# Patient Record
Sex: MALE | Race: WHITE | NOT HISPANIC OR LATINO | Employment: FULL TIME | URBAN - METROPOLITAN AREA
[De-identification: names, ages, dates, MRNs, and addresses within clinical notes are randomized per-mention and may not be internally consistent; named-entity substitution may affect disease eponyms.]

---

## 2022-11-29 ENCOUNTER — HOSPITAL ENCOUNTER (EMERGENCY)
Facility: MEDICAL CENTER | Age: 27
End: 2022-11-30
Attending: EMERGENCY MEDICINE
Payer: COMMERCIAL

## 2022-11-29 ENCOUNTER — APPOINTMENT (OUTPATIENT)
Dept: RADIOLOGY | Facility: MEDICAL CENTER | Age: 27
End: 2022-11-29
Attending: EMERGENCY MEDICINE
Payer: COMMERCIAL

## 2022-11-29 VITALS
SYSTOLIC BLOOD PRESSURE: 114 MMHG | RESPIRATION RATE: 17 BRPM | HEART RATE: 71 BPM | DIASTOLIC BLOOD PRESSURE: 76 MMHG | WEIGHT: 252.21 LBS | TEMPERATURE: 97.3 F | OXYGEN SATURATION: 96 %

## 2022-11-29 DIAGNOSIS — K82.8 GALLBLADDER SLUDGE: ICD-10-CM

## 2022-11-29 DIAGNOSIS — R10.11 RIGHT UPPER QUADRANT ABDOMINAL PAIN: ICD-10-CM

## 2022-11-29 DIAGNOSIS — R11.2 NAUSEA AND VOMITING, UNSPECIFIED VOMITING TYPE: ICD-10-CM

## 2022-11-29 LAB
ALBUMIN SERPL BCP-MCNC: 4.9 G/DL (ref 3.2–4.9)
ALBUMIN/GLOB SERPL: 1.8 G/DL
ALP SERPL-CCNC: 69 U/L (ref 30–99)
ALT SERPL-CCNC: 68 U/L (ref 2–50)
ANION GAP SERPL CALC-SCNC: 13 MMOL/L (ref 7–16)
AST SERPL-CCNC: 31 U/L (ref 12–45)
BASOPHILS # BLD AUTO: 0.4 % (ref 0–1.8)
BASOPHILS # BLD: 0.03 K/UL (ref 0–0.12)
BILIRUB SERPL-MCNC: 0.7 MG/DL (ref 0.1–1.5)
BUN SERPL-MCNC: 11 MG/DL (ref 8–22)
CALCIUM SERPL-MCNC: 9.7 MG/DL (ref 8.5–10.5)
CHLORIDE SERPL-SCNC: 105 MMOL/L (ref 96–112)
CO2 SERPL-SCNC: 25 MMOL/L (ref 20–33)
CREAT SERPL-MCNC: 0.94 MG/DL (ref 0.5–1.4)
EOSINOPHIL # BLD AUTO: 0.07 K/UL (ref 0–0.51)
EOSINOPHIL NFR BLD: 0.9 % (ref 0–6.9)
ERYTHROCYTE [DISTWIDTH] IN BLOOD BY AUTOMATED COUNT: 37.9 FL (ref 35.9–50)
GFR SERPLBLD CREATININE-BSD FMLA CKD-EPI: 113 ML/MIN/1.73 M 2
GLOBULIN SER CALC-MCNC: 2.7 G/DL (ref 1.9–3.5)
GLUCOSE SERPL-MCNC: 87 MG/DL (ref 65–99)
HCT VFR BLD AUTO: 49.9 % (ref 42–52)
HGB BLD-MCNC: 16.7 G/DL (ref 14–18)
IMM GRANULOCYTES # BLD AUTO: 0.03 K/UL (ref 0–0.11)
IMM GRANULOCYTES NFR BLD AUTO: 0.4 % (ref 0–0.9)
LIPASE SERPL-CCNC: 32 U/L (ref 11–82)
LYMPHOCYTES # BLD AUTO: 3.07 K/UL (ref 1–4.8)
LYMPHOCYTES NFR BLD: 38.7 % (ref 22–41)
MCH RBC QN AUTO: 28 PG (ref 27–33)
MCHC RBC AUTO-ENTMCNC: 33.5 G/DL (ref 33.7–35.3)
MCV RBC AUTO: 83.7 FL (ref 81.4–97.8)
MONOCYTES # BLD AUTO: 0.49 K/UL (ref 0–0.85)
MONOCYTES NFR BLD AUTO: 6.2 % (ref 0–13.4)
NEUTROPHILS # BLD AUTO: 4.24 K/UL (ref 1.82–7.42)
NEUTROPHILS NFR BLD: 53.4 % (ref 44–72)
NRBC # BLD AUTO: 0 K/UL
NRBC BLD-RTO: 0 /100 WBC
PLATELET # BLD AUTO: 340 K/UL (ref 164–446)
PMV BLD AUTO: 9 FL (ref 9–12.9)
POTASSIUM SERPL-SCNC: 4.3 MMOL/L (ref 3.6–5.5)
PROT SERPL-MCNC: 7.6 G/DL (ref 6–8.2)
RBC # BLD AUTO: 5.96 M/UL (ref 4.7–6.1)
SODIUM SERPL-SCNC: 143 MMOL/L (ref 135–145)
WBC # BLD AUTO: 7.9 K/UL (ref 4.8–10.8)

## 2022-11-29 PROCEDURE — 36415 COLL VENOUS BLD VENIPUNCTURE: CPT

## 2022-11-29 PROCEDURE — 76705 ECHO EXAM OF ABDOMEN: CPT

## 2022-11-29 PROCEDURE — 80053 COMPREHEN METABOLIC PANEL: CPT

## 2022-11-29 PROCEDURE — 700105 HCHG RX REV CODE 258: Performed by: EMERGENCY MEDICINE

## 2022-11-29 PROCEDURE — 700117 HCHG RX CONTRAST REV CODE 255: Performed by: EMERGENCY MEDICINE

## 2022-11-29 PROCEDURE — 99284 EMERGENCY DEPT VISIT MOD MDM: CPT

## 2022-11-29 PROCEDURE — 85025 COMPLETE CBC W/AUTO DIFF WBC: CPT

## 2022-11-29 PROCEDURE — 74177 CT ABD & PELVIS W/CONTRAST: CPT

## 2022-11-29 PROCEDURE — 83690 ASSAY OF LIPASE: CPT

## 2022-11-29 RX ORDER — SODIUM CHLORIDE 9 MG/ML
1000 INJECTION, SOLUTION INTRAVENOUS ONCE
Status: COMPLETED | OUTPATIENT
Start: 2022-11-29 | End: 2022-11-30

## 2022-11-29 RX ADMIN — SODIUM CHLORIDE 1000 ML: 9 INJECTION, SOLUTION INTRAVENOUS at 21:45

## 2022-11-29 RX ADMIN — IOHEXOL 100 ML: 350 INJECTION, SOLUTION INTRAVENOUS at 23:37

## 2022-11-30 RX ORDER — ONDANSETRON 4 MG/1
4 TABLET, ORALLY DISINTEGRATING ORAL EVERY 6 HOURS PRN
Qty: 10 TABLET | Refills: 0 | Status: ON HOLD | OUTPATIENT
Start: 2022-11-30 | End: 2022-12-12

## 2022-11-30 NOTE — ED TRIAGE NOTES
Ambulate to triage  Chief Complaint   Patient presents with    Abdominal Pain     RUQ radiates to R flank and sternum x5 days, much worse today. Hx of gallstones, still has his gallbladder    N/V     Vomited a couple times today     Putting pressure on his abd makes it feel slightly better, any movement or eating makes it worse.

## 2022-11-30 NOTE — ED NOTES
Pt discharged home. IV discontinued and gauze placed, pt in possession of belongings. Pt provided discharge education and information pertaining to medications and follow up appointments. Pt received copy of discharge instructions and verbalized understanding. /76   Pulse 71   Temp 36.3 °C (97.3 °F) (Temporal)   Resp 17   Wt 114 kg (252 lb 3.3 oz)   SpO2 96%

## 2022-11-30 NOTE — ED PROVIDER NOTES
ED Provider Note    CHIEF COMPLAINT  Chief Complaint   Patient presents with    Abdominal Pain     RUQ radiates to R flank and sternum x5 days, much worse today. Hx of gallstones, still has his gallbladder    N/V     Vomited a couple times today       HPI  Aneudy Bolanos is a 27 y.o. male who presents to the emergency department complaining of nausea and vomiting.  Past medical history largely benign.  He reports that he has previously been told about possibly having gallstones.  Over the last week he has been having increasing pain to his right upper abdomen.  Now with a deep throbbing pain which also radiates to his back and predominately on the right side.  Worse with eating.  Associated nausea but minimal vomiting.  Loose stools.  No sore throat or other known sick contacts.  No lower abdominal pain.  No difficulty urinating.  No fever chills.  No jaundice.  No foreign travel.    REVIEW OF SYSTEMS  See HPI for further details. All other systems are negative.     PAST MEDICAL HISTORY       SOCIAL HISTORY  Social History     Tobacco Use    Smoking status: Never    Smokeless tobacco: Current     Types: Chew   Vaping Use    Vaping Use: Never used   Substance and Sexual Activity    Alcohol use: Yes     Comment: rare    Drug use: Never    Sexual activity: Not on file       SURGICAL HISTORY  patient denies any surgical history    CURRENT MEDICATIONS  Home Medications       Reviewed by Tracey Ghotra R.N. (Registered Nurse) on 11/29/22 at 1749  Med List Status: Complete     Medication Last Dose Status        Patient Mikhail Taking any Medications                           ALLERGIES  No Known Allergies    PHYSICAL EXAM  VITAL SIGNS: /76   Pulse 71   Temp 36.3 °C (97.3 °F) (Temporal)   Resp 17   Wt 114 kg (252 lb 3.3 oz)   SpO2 96%  @[id number][[id number]::@   Pulse ox interpretation: I interpret this pulse ox as normal.  Constitutional: Alert in no apparent distress.  HENT: No signs of trauma, Bilateral  external ears normal, Nose normal.   Eyes: Pupils are equal and reactive  Neck: Normal range of motion, No tenderness, Supple  Cardiovascular: Regular rate and rhythm, no murmurs.   Thorax & Lungs: Normal breath sounds, No respiratory distress, No wheezing, No chest tenderness.   Abdomen: Bowel sounds normal, Soft, right upper quadrant tenderness with positive Baker's  Skin: Warm, Dry, No erythema, No rash.   Back: No bony tenderness, No CVA tenderness.   Extremities: Intact distal pulses  Musculoskeletal: Good range of motion in all major joints. No tenderness to palpation or major deformities noted.   Neurologic: Alert , Normal motor function, Normal sensory function, No focal deficits noted.   Psychiatric: Affect normal, Judgment normal, Mood normal.       DIAGNOSTIC STUDIES / PROCEDURES      LABS  Results for orders placed or performed during the hospital encounter of 11/29/22   CBC WITH DIFFERENTIAL   Result Value Ref Range    WBC 7.9 4.8 - 10.8 K/uL    RBC 5.96 4.70 - 6.10 M/uL    Hemoglobin 16.7 14.0 - 18.0 g/dL    Hematocrit 49.9 42.0 - 52.0 %    MCV 83.7 81.4 - 97.8 fL    MCH 28.0 27.0 - 33.0 pg    MCHC 33.5 (L) 33.7 - 35.3 g/dL    RDW 37.9 35.9 - 50.0 fL    Platelet Count 340 164 - 446 K/uL    MPV 9.0 9.0 - 12.9 fL    Neutrophils-Polys 53.40 44.00 - 72.00 %    Lymphocytes 38.70 22.00 - 41.00 %    Monocytes 6.20 0.00 - 13.40 %    Eosinophils 0.90 0.00 - 6.90 %    Basophils 0.40 0.00 - 1.80 %    Immature Granulocytes 0.40 0.00 - 0.90 %    Nucleated RBC 0.00 /100 WBC    Neutrophils (Absolute) 4.24 1.82 - 7.42 K/uL    Lymphs (Absolute) 3.07 1.00 - 4.80 K/uL    Monos (Absolute) 0.49 0.00 - 0.85 K/uL    Eos (Absolute) 0.07 0.00 - 0.51 K/uL    Baso (Absolute) 0.03 0.00 - 0.12 K/uL    Immature Granulocytes (abs) 0.03 0.00 - 0.11 K/uL    NRBC (Absolute) 0.00 K/uL   COMP METABOLIC PANEL   Result Value Ref Range    Sodium 143 135 - 145 mmol/L    Potassium 4.3 3.6 - 5.5 mmol/L    Chloride 105 96 - 112 mmol/L    Co2  25 20 - 33 mmol/L    Anion Gap 13.0 7.0 - 16.0    Glucose 87 65 - 99 mg/dL    Bun 11 8 - 22 mg/dL    Creatinine 0.94 0.50 - 1.40 mg/dL    Calcium 9.7 8.5 - 10.5 mg/dL    AST(SGOT) 31 12 - 45 U/L    ALT(SGPT) 68 (H) 2 - 50 U/L    Alkaline Phosphatase 69 30 - 99 U/L    Total Bilirubin 0.7 0.1 - 1.5 mg/dL    Albumin 4.9 3.2 - 4.9 g/dL    Total Protein 7.6 6.0 - 8.2 g/dL    Globulin 2.7 1.9 - 3.5 g/dL    A-G Ratio 1.8 g/dL   LIPASE   Result Value Ref Range    Lipase 32 11 - 82 U/L   ESTIMATED GFR   Result Value Ref Range    GFR (CKD-EPI) 113 >60 mL/min/1.73 m 2         RADIOLOGY  CT-ABDOMEN-PELVIS WITH   Final Result         1.  Low-density changes of liver compatible with hepatic steatosis.   2.  Small fat-containing umbilical hernia      US-RUQ   Final Result         1.  Hepatomegaly and echogenic liver, compatible with fatty change versus fibrosis.   2.  Gallbladder sludge without additional sonographic findings of acute cholecystitis            COURSE & MEDICAL DECISION MAKING  Pertinent Labs & Imaging studies reviewed. (See chart for details)    27-year-old male presenting to the emergency department with the above presentation.  Prior history of possible gallstones.  Work-up today is largely benign.  Hematology is reassuring.  Initial ultrasound imaging does show some hepatomegaly and echogenic liver in keeping with fatty liver changes versus fibrosis.  Furthermore there is identified gallbladder sludge but no stones and no evidence of acute cholecystitis or ductal obstruction.  At this point the patient is feeling better.  Tolerating p.o.'s.  Will be discharged on Zofran.  He has been referred to local gastroenterologist as well as general surgery.  Understanding of diet to follow and return precautions if needed.      The patient will return for worsening symptoms and is stable at the time of discharge. The patient verbalizes understanding and will comply.    FINAL IMPRESSION  1. Nausea and vomiting, unspecified  vomiting type    2. Right upper quadrant abdominal pain    3. Gallbladder sludge            Electronically signed by: Bassem Burnette M.D., 11/29/2022 9:28 PM

## 2022-11-30 NOTE — ED NOTES
"RV @ 2030  Patient states \"The pain hurts so much!\" Patient gripping right side.  Apologized for wait times  "

## 2022-12-09 ENCOUNTER — PRE-ADMISSION TESTING (OUTPATIENT)
Dept: ADMISSIONS | Facility: MEDICAL CENTER | Age: 27
End: 2022-12-09
Attending: SURGERY
Payer: COMMERCIAL

## 2022-12-09 RX ORDER — IBUPROFEN 200 MG
200 TABLET ORAL EVERY 8 HOURS PRN
Status: ON HOLD | COMMUNITY
End: 2022-12-12

## 2022-12-09 RX ORDER — ACETAMINOPHEN 500 MG
1000 TABLET ORAL EVERY 6 HOURS PRN
COMMUNITY

## 2022-12-12 ENCOUNTER — ANESTHESIA EVENT (OUTPATIENT)
Dept: SURGERY | Facility: MEDICAL CENTER | Age: 27
End: 2022-12-12
Payer: COMMERCIAL

## 2022-12-12 ENCOUNTER — ANESTHESIA (OUTPATIENT)
Dept: SURGERY | Facility: MEDICAL CENTER | Age: 27
End: 2022-12-12
Payer: COMMERCIAL

## 2022-12-12 ENCOUNTER — HOSPITAL ENCOUNTER (OUTPATIENT)
Facility: MEDICAL CENTER | Age: 27
End: 2022-12-12
Attending: SURGERY | Admitting: SURGERY
Payer: COMMERCIAL

## 2022-12-12 ENCOUNTER — PHARMACY VISIT (OUTPATIENT)
Dept: PHARMACY | Facility: MEDICAL CENTER | Age: 27
End: 2022-12-12
Payer: MEDICARE

## 2022-12-12 VITALS
SYSTOLIC BLOOD PRESSURE: 128 MMHG | HEIGHT: 76 IN | BODY MASS INDEX: 29.8 KG/M2 | RESPIRATION RATE: 18 BRPM | HEART RATE: 60 BPM | WEIGHT: 244.71 LBS | TEMPERATURE: 98.2 F | DIASTOLIC BLOOD PRESSURE: 72 MMHG | OXYGEN SATURATION: 92 %

## 2022-12-12 DIAGNOSIS — K81.9 CHOLECYSTITIS: ICD-10-CM

## 2022-12-12 LAB — PATHOLOGY CONSULT NOTE: NORMAL

## 2022-12-12 PROCEDURE — 700111 HCHG RX REV CODE 636 W/ 250 OVERRIDE (IP): Performed by: ANESTHESIOLOGY

## 2022-12-12 PROCEDURE — 700105 HCHG RX REV CODE 258: Performed by: SURGERY

## 2022-12-12 PROCEDURE — 160029 HCHG SURGERY MINUTES - 1ST 30 MINS LEVEL 4: Performed by: SURGERY

## 2022-12-12 PROCEDURE — 160046 HCHG PACU - 1ST 60 MINS PHASE II: Performed by: SURGERY

## 2022-12-12 PROCEDURE — 160025 RECOVERY II MINUTES (STATS): Performed by: SURGERY

## 2022-12-12 PROCEDURE — 160048 HCHG OR STATISTICAL LEVEL 1-5: Performed by: SURGERY

## 2022-12-12 PROCEDURE — 160041 HCHG SURGERY MINUTES - EA ADDL 1 MIN LEVEL 4: Performed by: SURGERY

## 2022-12-12 PROCEDURE — 160002 HCHG RECOVERY MINUTES (STAT): Performed by: SURGERY

## 2022-12-12 PROCEDURE — 160035 HCHG PACU - 1ST 60 MINS PHASE I: Performed by: SURGERY

## 2022-12-12 PROCEDURE — 700101 HCHG RX REV CODE 250: Performed by: SURGERY

## 2022-12-12 PROCEDURE — 00790 ANES IPER UPR ABD NOS: CPT | Performed by: ANESTHESIOLOGY

## 2022-12-12 PROCEDURE — RXMED WILLOW AMBULATORY MEDICATION CHARGE: Performed by: PHYSICIAN ASSISTANT

## 2022-12-12 PROCEDURE — A9270 NON-COVERED ITEM OR SERVICE: HCPCS | Performed by: ANESTHESIOLOGY

## 2022-12-12 PROCEDURE — 160036 HCHG PACU - EA ADDL 30 MINS PHASE I: Performed by: SURGERY

## 2022-12-12 PROCEDURE — 160009 HCHG ANES TIME/MIN: Performed by: SURGERY

## 2022-12-12 PROCEDURE — 700101 HCHG RX REV CODE 250: Performed by: ANESTHESIOLOGY

## 2022-12-12 PROCEDURE — 88304 TISSUE EXAM BY PATHOLOGIST: CPT

## 2022-12-12 PROCEDURE — 700102 HCHG RX REV CODE 250 W/ 637 OVERRIDE(OP): Performed by: ANESTHESIOLOGY

## 2022-12-12 RX ORDER — SODIUM CHLORIDE, SODIUM LACTATE, POTASSIUM CHLORIDE, CALCIUM CHLORIDE 600; 310; 30; 20 MG/100ML; MG/100ML; MG/100ML; MG/100ML
INJECTION, SOLUTION INTRAVENOUS CONTINUOUS
Status: DISCONTINUED | OUTPATIENT
Start: 2022-12-12 | End: 2022-12-12 | Stop reason: HOSPADM

## 2022-12-12 RX ORDER — CEFAZOLIN SODIUM 1 G/3ML
INJECTION, POWDER, FOR SOLUTION INTRAMUSCULAR; INTRAVENOUS PRN
Status: DISCONTINUED | OUTPATIENT
Start: 2022-12-12 | End: 2022-12-12 | Stop reason: SURG

## 2022-12-12 RX ORDER — MEPERIDINE HYDROCHLORIDE 25 MG/ML
6.25 INJECTION INTRAMUSCULAR; INTRAVENOUS; SUBCUTANEOUS
Status: DISCONTINUED | OUTPATIENT
Start: 2022-12-12 | End: 2022-12-12 | Stop reason: HOSPADM

## 2022-12-12 RX ORDER — BUPIVACAINE HYDROCHLORIDE AND EPINEPHRINE 5; 5 MG/ML; UG/ML
INJECTION, SOLUTION EPIDURAL; INTRACAUDAL; PERINEURAL
Status: DISCONTINUED | OUTPATIENT
Start: 2022-12-12 | End: 2022-12-12 | Stop reason: HOSPADM

## 2022-12-12 RX ORDER — HYDROMORPHONE HYDROCHLORIDE 1 MG/ML
0.1 INJECTION, SOLUTION INTRAMUSCULAR; INTRAVENOUS; SUBCUTANEOUS
Status: DISCONTINUED | OUTPATIENT
Start: 2022-12-12 | End: 2022-12-12 | Stop reason: HOSPADM

## 2022-12-12 RX ORDER — MIDAZOLAM HYDROCHLORIDE 1 MG/ML
1 INJECTION INTRAMUSCULAR; INTRAVENOUS
Status: DISCONTINUED | OUTPATIENT
Start: 2022-12-12 | End: 2022-12-12 | Stop reason: HOSPADM

## 2022-12-12 RX ORDER — OXYCODONE HYDROCHLORIDE 5 MG/1
5 TABLET ORAL EVERY 4 HOURS PRN
Qty: 12 TABLET | Refills: 0 | Status: SHIPPED | OUTPATIENT
Start: 2022-12-12 | End: 2022-12-15

## 2022-12-12 RX ORDER — LIDOCAINE HYDROCHLORIDE 20 MG/ML
INJECTION, SOLUTION EPIDURAL; INFILTRATION; INTRACAUDAL; PERINEURAL PRN
Status: DISCONTINUED | OUTPATIENT
Start: 2022-12-12 | End: 2022-12-12 | Stop reason: SURG

## 2022-12-12 RX ORDER — ONDANSETRON 2 MG/ML
INJECTION INTRAMUSCULAR; INTRAVENOUS PRN
Status: DISCONTINUED | OUTPATIENT
Start: 2022-12-12 | End: 2022-12-12 | Stop reason: SURG

## 2022-12-12 RX ORDER — LABETALOL HYDROCHLORIDE 5 MG/ML
5 INJECTION, SOLUTION INTRAVENOUS
Status: DISCONTINUED | OUTPATIENT
Start: 2022-12-12 | End: 2022-12-12 | Stop reason: HOSPADM

## 2022-12-12 RX ORDER — SODIUM CHLORIDE, SODIUM LACTATE, POTASSIUM CHLORIDE, CALCIUM CHLORIDE 600; 310; 30; 20 MG/100ML; MG/100ML; MG/100ML; MG/100ML
INJECTION, SOLUTION INTRAVENOUS CONTINUOUS
Status: ACTIVE | OUTPATIENT
Start: 2022-12-12 | End: 2022-12-12

## 2022-12-12 RX ORDER — ONDANSETRON 2 MG/ML
4 INJECTION INTRAMUSCULAR; INTRAVENOUS
Status: COMPLETED | OUTPATIENT
Start: 2022-12-12 | End: 2022-12-12

## 2022-12-12 RX ORDER — ROCURONIUM BROMIDE 10 MG/ML
INJECTION, SOLUTION INTRAVENOUS PRN
Status: DISCONTINUED | OUTPATIENT
Start: 2022-12-12 | End: 2022-12-12 | Stop reason: SURG

## 2022-12-12 RX ORDER — HYDROMORPHONE HYDROCHLORIDE 1 MG/ML
0.2 INJECTION, SOLUTION INTRAMUSCULAR; INTRAVENOUS; SUBCUTANEOUS
Status: DISCONTINUED | OUTPATIENT
Start: 2022-12-12 | End: 2022-12-12 | Stop reason: HOSPADM

## 2022-12-12 RX ORDER — IBUPROFEN 800 MG/1
800 TABLET ORAL EVERY 8 HOURS PRN
Qty: 30 TABLET | COMMUNITY
Start: 2022-12-12

## 2022-12-12 RX ORDER — OXYCODONE HCL 5 MG/5 ML
10 SOLUTION, ORAL ORAL
Status: COMPLETED | OUTPATIENT
Start: 2022-12-12 | End: 2022-12-12

## 2022-12-12 RX ORDER — DEXAMETHASONE SODIUM PHOSPHATE 4 MG/ML
INJECTION, SOLUTION INTRA-ARTICULAR; INTRALESIONAL; INTRAMUSCULAR; INTRAVENOUS; SOFT TISSUE PRN
Status: DISCONTINUED | OUTPATIENT
Start: 2022-12-12 | End: 2022-12-12 | Stop reason: SURG

## 2022-12-12 RX ORDER — MIDAZOLAM HYDROCHLORIDE 1 MG/ML
INJECTION INTRAMUSCULAR; INTRAVENOUS PRN
Status: DISCONTINUED | OUTPATIENT
Start: 2022-12-12 | End: 2022-12-12 | Stop reason: SURG

## 2022-12-12 RX ORDER — KETOROLAC TROMETHAMINE 30 MG/ML
INJECTION, SOLUTION INTRAMUSCULAR; INTRAVENOUS PRN
Status: DISCONTINUED | OUTPATIENT
Start: 2022-12-12 | End: 2022-12-12 | Stop reason: SURG

## 2022-12-12 RX ORDER — DIPHENHYDRAMINE HYDROCHLORIDE 50 MG/ML
12.5 INJECTION INTRAMUSCULAR; INTRAVENOUS
Status: DISCONTINUED | OUTPATIENT
Start: 2022-12-12 | End: 2022-12-12 | Stop reason: HOSPADM

## 2022-12-12 RX ORDER — HYDRALAZINE HYDROCHLORIDE 20 MG/ML
5 INJECTION INTRAMUSCULAR; INTRAVENOUS
Status: DISCONTINUED | OUTPATIENT
Start: 2022-12-12 | End: 2022-12-12 | Stop reason: HOSPADM

## 2022-12-12 RX ORDER — HYDROMORPHONE HYDROCHLORIDE 1 MG/ML
0.4 INJECTION, SOLUTION INTRAMUSCULAR; INTRAVENOUS; SUBCUTANEOUS
Status: DISCONTINUED | OUTPATIENT
Start: 2022-12-12 | End: 2022-12-12 | Stop reason: HOSPADM

## 2022-12-12 RX ORDER — OXYCODONE HCL 5 MG/5 ML
5 SOLUTION, ORAL ORAL
Status: COMPLETED | OUTPATIENT
Start: 2022-12-12 | End: 2022-12-12

## 2022-12-12 RX ORDER — HALOPERIDOL 5 MG/ML
1 INJECTION INTRAMUSCULAR
Status: DISCONTINUED | OUTPATIENT
Start: 2022-12-12 | End: 2022-12-12 | Stop reason: HOSPADM

## 2022-12-12 RX ADMIN — FENTANYL CITRATE 50 MCG: 50 INJECTION INTRAMUSCULAR; INTRAVENOUS at 12:47

## 2022-12-12 RX ADMIN — SODIUM CHLORIDE, POTASSIUM CHLORIDE, SODIUM LACTATE AND CALCIUM CHLORIDE: 600; 310; 30; 20 INJECTION, SOLUTION INTRAVENOUS at 09:30

## 2022-12-12 RX ADMIN — ROCURONIUM BROMIDE 50 MG: 10 INJECTION, SOLUTION INTRAVENOUS at 11:12

## 2022-12-12 RX ADMIN — OXYCODONE HYDROCHLORIDE 10 MG: 5 SOLUTION ORAL at 12:45

## 2022-12-12 RX ADMIN — KETOROLAC TROMETHAMINE 30 MG: 30 INJECTION, SOLUTION INTRAMUSCULAR at 11:48

## 2022-12-12 RX ADMIN — LIDOCAINE HYDROCHLORIDE 50 MG: 20 INJECTION, SOLUTION EPIDURAL; INFILTRATION; INTRACAUDAL at 11:12

## 2022-12-12 RX ADMIN — FENTANYL CITRATE 50 MCG: 50 INJECTION, SOLUTION INTRAMUSCULAR; INTRAVENOUS at 11:32

## 2022-12-12 RX ADMIN — FENTANYL CITRATE 50 MCG: 50 INJECTION, SOLUTION INTRAMUSCULAR; INTRAVENOUS at 11:48

## 2022-12-12 RX ADMIN — DEXAMETHASONE SODIUM PHOSPHATE 8 MG: 4 INJECTION, SOLUTION INTRA-ARTICULAR; INTRALESIONAL; INTRAMUSCULAR; INTRAVENOUS; SOFT TISSUE at 11:12

## 2022-12-12 RX ADMIN — FENTANYL CITRATE 50 MCG: 50 INJECTION INTRAMUSCULAR; INTRAVENOUS at 12:53

## 2022-12-12 RX ADMIN — FENTANYL CITRATE 50 MCG: 50 INJECTION, SOLUTION INTRAMUSCULAR; INTRAVENOUS at 11:40

## 2022-12-12 RX ADMIN — ONDANSETRON 4 MG: 2 INJECTION INTRAMUSCULAR; INTRAVENOUS at 11:48

## 2022-12-12 RX ADMIN — MIDAZOLAM HYDROCHLORIDE 2 MG: 1 INJECTION, SOLUTION INTRAMUSCULAR; INTRAVENOUS at 11:08

## 2022-12-12 RX ADMIN — FENTANYL CITRATE 100 MCG: 50 INJECTION, SOLUTION INTRAMUSCULAR; INTRAVENOUS at 11:12

## 2022-12-12 RX ADMIN — CEFAZOLIN 2 G: 330 INJECTION, POWDER, FOR SOLUTION INTRAMUSCULAR; INTRAVENOUS at 11:14

## 2022-12-12 RX ADMIN — PROPOFOL 200 MG: 10 INJECTION, EMULSION INTRAVENOUS at 11:12

## 2022-12-12 RX ADMIN — SUGAMMADEX 200 MG: 100 INJECTION, SOLUTION INTRAVENOUS at 11:48

## 2022-12-12 RX ADMIN — ONDANSETRON 4 MG: 2 INJECTION INTRAMUSCULAR; INTRAVENOUS at 12:49

## 2022-12-12 RX ADMIN — HALOPERIDOL LACTATE 1 MG: 5 INJECTION, SOLUTION INTRAMUSCULAR at 12:54

## 2022-12-12 RX ADMIN — HYDROMORPHONE HYDROCHLORIDE 0.4 MG: 1 INJECTION, SOLUTION INTRAMUSCULAR; INTRAVENOUS; SUBCUTANEOUS at 13:41

## 2022-12-12 ASSESSMENT — FIBROSIS 4 INDEX: FIB4 SCORE: 0.31

## 2022-12-12 ASSESSMENT — PAIN SCALES - GENERAL: PAIN_LEVEL: 5

## 2022-12-12 NOTE — PROGRESS NOTES
Med rec updated and complete, per pt   Allergies reviewed, per pt  Interviewed pt with wife at bedside with permission from pt.

## 2022-12-12 NOTE — ANESTHESIA PROCEDURE NOTES
Airway    Date/Time: 12/12/2022 11:13 AM  Performed by: Vikash Dukes M.D.  Authorized by: Vikash Dukes M.D.     Location:  OR  Urgency:  Elective  Indications for Airway Management:  Anesthesia      Spontaneous Ventilation: absent    Sedation Level:  Deep  Preoxygenated: Yes    Patient Position:  Sniffing  Mask Difficulty Assessment:  0 - not attempted  Final Airway Type:  Endotracheal airway  Final Endotracheal Airway:  ETT  Cuffed: Yes    Technique Used for Successful ETT Placement:  Direct laryngoscopy    Insertion Site:  Oral  Blade Type:  Amanda  Laryngoscope Blade/Videolaryngoscope Blade Size:  3  ETT Size (mm):  7.5  Measured from:  Teeth  ETT to Teeth (cm):  23  Placement Verified by: auscultation and capnometry    Cormack-Lehane Classification:  Grade I - full view of glottis  Number of Attempts at Approach:  1

## 2022-12-12 NOTE — OR NURSING
1510 Arrived from PACU , Pt's VSS; denies N/V; states pain is at tolerable level. Dermabond CDI to lap sites X 4. Wife at the bedside    1555 D/c orders received. IV dc'd. Pt changed into clothing with assistance. Discharge instructions reviewed, Pt and family verbalized understanding and questions answered. Patient states ready to d/c home. Pt dc'd in w/c with CNA.

## 2022-12-12 NOTE — DISCHARGE INSTRUCTIONS
HOME CARE INSTRUCTIONS    ACTIVITY: Rest and take it easy for the first 24 hours.  A responsible adult is recommended to remain with you during that time.  It is normal to feel sleepy.  We encourage you to not do anything that requires balance, judgment or coordination.    FOR 24 HOURS DO NOT:  Drive, operate machinery or run household appliances.  Drink beer or alcoholic beverages.  Make important decisions or sign legal documents.         DIET: To avoid nausea, slowly advance diet as tolerated, avoiding spicy or greasy foods for the first day.  Add more substantial food to your diet according to your physician's instructions.  Babies can be fed formula or breast milk as soon as they are hungry.  INCREASE FLUIDS AND FIBER TO AVOID CONSTIPATION     MEDICATIONS: Resume taking daily medication.  Take prescribed pain medication with food.  If no medication is prescribed, you may take non-aspirin pain medication if needed.  PAIN MEDICATION CAN BE VERY CONSTIPATING.  Take a stool softener or laxative such as senokot, pericolace, or milk of magnesia if needed.    Prescriptions sent to your pharmacy   Last pain medication given at 12:50    A follow-up appointment should be arranged with your doctor in 1-2 weeks; call to schedule.    You should CALL YOUR PHYSICIAN if you develop:  Fever greater than 101 degrees F.  Pain not relieved by medication, or persistent nausea or vomiting.  Excessive bleeding (blood soaking through dressing) or unexpected drainage from the wound.  Extreme redness or swelling around the incision site, drainage of pus or foul smelling drainage.  Inability to urinate or empty your bladder within 8 hours.  Problems with breathing or chest pain.    You should call 911 if you develop problems with breathing or chest pain.  If you are unable to contact your doctor or surgical center, you should go to the nearest emergency room or urgent care center.  Physician's telephone #: [phone]    MILD FLU-LIKE  SYMPTOMS ARE NORMAL.  YOU MAY EXPERIENCE GENERALIZED MUSCLE ACHES, THROAT IRRITATION, HEADACHE AND/OR SOME NAUSEA.    If any questions arise, call your doctor.  If your doctor is not available, please feel free to call the Surgical Center at [phone].  The Center is open Monday through Friday from 7AM to 7PM.      A registered nurse may call you a few days after your surgery to see how you are doing after your procedure.    You may also receive a survey in the mail within the next two weeks and we ask that you take a few moments to complete the survey and return it to us.  Our goal is to provide you with very good care and we value your comments.     Depression / Suicide Risk    As you are discharged from this [facility] facility, it is important to learn how to keep safe from harming yourself.    Recognize the warning signs:  Abrupt changes in personality, positive or negative- including increase in energy   Giving away possessions  Change in eating patterns- significant weight changes-  positive or negative  Change in sleeping patterns- unable to sleep or sleeping all the time   Unwillingness or inability to communicate  Depression  Unusual sadness, discouragement and loneliness  Talk of wanting to die  Neglect of personal appearance   Rebelliousness- reckless behavior  Withdrawal from people/activities they love  Confusion- inability to concentrate     If you or a loved one observes any of these behaviors or has concerns about self-harm, here's what you can do:  Talk about it- your feelings and reasons for harming yourself  Remove any means that you might use to hurt yourself (examples: pills, rope, extension cords, firearm)  Get professional help from the community (Mental Health, Substance Abuse, psychological counseling)  Do not be alone:Call your Safe Contact- someone whom you trust who will be there for you.  Call your local CRISIS HOTLINE 290-7875 or 943-198-8860  Call your local Children's Mobile  Crisis Response Team [place] (836) 608-6336 or www.[employer]  Call the toll free National Suicide Prevention Hotlines   National Suicide Prevention Lifeline 453-173-HBCQ (5734)  [facility] Line Network 800-SUICIDE (154-2413)    I acknowledge receipt and understanding of these Home Care instructions.

## 2022-12-12 NOTE — ANESTHESIA POSTPROCEDURE EVALUATION
Patient: Aneudy Bolanos    Procedure Summary     Date: 12/12/22 Room / Location: [facility] 09 / SURGERY [facility]    Anesthesia Start: 1108 Anesthesia Stop: 1204    Procedure: CHOLECYSTECTOMY, LAPAROSCOPIC (Abdomen) Diagnosis: (CHRONIC CHOLECYSTITIS)    Surgeons: Chano Aguilera D.O. Responsible Provider: Vikash Dukes M.D.    Anesthesia Type: general ASA Status: 1          Final Anesthesia Type: general  Last vitals  BP   Blood Pressure: 124/60    Temp   36 °C (96.8 °F)    Pulse   (!) 54   Resp   15    SpO2   90 %      Anesthesia Post Evaluation    Patient location during evaluation: PACU  Patient participation: complete - patient participated  Level of consciousness: awake and alert  Pain score: 5    Airway patency: patent  Anesthetic complications: no  Cardiovascular status: hemodynamically stable  Respiratory status: acceptable  Hydration status: euvolemic    PONV: none          There were no known notable events for this encounter.

## 2022-12-12 NOTE — ANESTHESIA PREPROCEDURE EVALUATION
Case: [id number] Date/Time: 12/12/22 1036    Procedure: CHOLECYSTECTOMY, LAPAROSCOPIC (Abdomen)    Anesthesia type: General    Pre-op diagnosis: CHRONIC CHOLECYSTITIS    Location: [facility] OR 09 / SURGERY [facility]    Surgeons: Chano Aguilera D.O.          Relevant Problems   ANESTHESIA (within normal limits)      PULMONARY (within normal limits)      NEURO (within normal limits)      CARDIAC (within normal limits)      GI (within normal limits)       (within normal limits)      ENDO (within normal limits)       Physical Exam    Airway   Mallampati: II  TM distance: >3 FB  Neck ROM: full       Cardiovascular - normal exam  Rhythm: regular  Rate: normal  (-) murmur     Dental - normal exam           Pulmonary - normal exam  Breath sounds clear to auscultation     Abdominal    Neurological - normal exam                 Anesthesia Plan    ASA 1       Plan - general       Airway plan will be ETT          Induction: intravenous    Postoperative Plan: Postoperative administration of opioids is intended.    Pertinent diagnostic labs and testing reviewed    Informed Consent:    Anesthetic plan and risks discussed with patient.    Use of blood products discussed with: patient whom consented to blood products.

## 2022-12-12 NOTE — OR NURSING
Pt arrives from OR to PACU at 1201. Pt identification verified by team, pt placed on all monitors with alarms audible, report and care of pt received from Anesthesiologist and RN. Assessment completed, pt changed into hospital gown and provided with warm blankets.

## 2024-04-09 NOTE — OP REPORT
"SPIRITUAL HEALTH SERVICES CONSULT NOTE  [facility]; P4    Saw pt Stella Greene per Spiritual Care Consult. Notified by unit staff that family was inquiring about a .    Patient/Family Understanding of Illness and Goals of Care - Stella was sleeping at the time of my visit. Met with her  Antoni and a DIL at her bedside. Antoni shares that another DIL had requested sacramental anointing for Stella. I explained that our hospital  will be here on Thursday and that we can make an urgent request if needed before then. He replied \"Let's leave it that the hospital  can come see Stella if she is still here on Thursday.\"  No other concerns verbalized at this time.     Distress and Loss - Family experiencing anticipatory grief    Strengths, Coping, and Resources - Family supporting one another    Meaning, Beliefs, and Spirituality - Antoni shares that Stella was raised [employer] and that she has not had a connection to a parish for many years.     Plan of Care: Spiritual care staff will remain available for further follow-up as requested by patient, family or staff.      Time Spent with Patient/Family: 10 minutes    Shari Ernandez M.Div.      Office: [phone] (for non-urgent requests)  Please Vocera or page through [facility] for time-sensitive requests    " Operative report    PreOp Diagnosis: Chronic cholecystitis      PostOp Diagnosis: Same      Procedure(s):  CHOLECYSTECTOMY, LAPAROSCOPIC - Wound Class: Clean Contaminated    Surgeon(s):  Chano Aguilera D.O.    Anesthesiologist/Type of Anesthesia:  Anesthesiologist: Vikash Dukes M.D./General    Surgical Staff:  Circulator: Roberto Parrish R.N.  Scrub Person: Eliane Zepeda  First Assist: Maria C Quezada P.A.-C.    Specimens removed if any:  ID Type Source Tests Collected by Time Destination   A : Gallbladder Tissue Gallbladder PATHOLOGY SPECIMEN Chano Aguilera D.O. 12/12/2022 11:35 AM        Estimated Blood Loss: Minimal    Findings: Very large gallbladder with small cystic duct and no obvious stones    Complications: None noted    Indication: 27-year-old male with a long history of right upper quadrant abdominal pain with possible episodes of choledocholithiasis in the past who was referred to the office for evaluation and management.    OPERATIVE REPORT: The patient was brought to the operating room and placed in  supine position on the operating table. After adequate general anesthesia,   the abdomen was prepped and draped in standard fashion. An area inferior to the umbilucus was infiltrated with 0.25% bupivacaine. An incision was made through the skin and subcutaneous tissues. We then bluntly dissected down to the anterior fascia, which was elevated into the wound using a Kocher clamp. A stay suture of 0 Vicryl was then placed. The fascia was then incised and we dissected through the abdominal wall in layers until the peritoneum was entered. We then placed the Willie port and insufflated the abdomen. The area in the epigastrium was chosen for a 5 mm port. The skin and subcutaneous tissue were infiltrated with 0.25% bupivacaine. A small incision was made and a 5 mm port was inserted under direct camera observation. Two additional 5 mm ports were placed in the right lateral abdominal wall using identical  technique. I then grasped the fundus of the gallbladder and retracted it anteriorly and superiorly. The infundibulum was retracted anteriorly and laterally. We then skeletonized the cystic duct, doubly clip distally and singly clipped proximally and then divided with the endoscopic shear. The cystic artery was then skeletonized and doubly clipped proximally and singly clipped distally prior to dividing with the endoscopic shear. The gallbladder was then dissected free from its fossa. When this was completed, we placed in an EndoCatch bag and retrieved it from the umbilical port site. We then irrigated the gallbladder fossa in the right upper quadrant until the effluent was clear. There was no sign of bleeding or bile leakage. The ports were then removed. The fascia at the umbilical port site was closed using the stay suture placed at the beginning of the case. The wounds were then irrigated and the skin was closed using a 4-0 Monocryl stitch in a subcuticular fashion. The area was then cleaned and dried and Dermabond was applied. The patient was then awakened from anesthesia and taken to post-anesthesia care unit in stable condition. The sponge, needle, and instrument count was correct at the end of the case and I was present for the entirety of the case.     The nature of the surgical procedure warranted additional skilled operative assistance from a licensed Physician Assistant (DEVON). The assistant was present during the entire operation. The surgical assistant performed the following: provided assistance with optimal surgical exposure of the operative field, provided high complexity, subspecialty decision making input, operated the camera for the laparoscopic portion of the procedure, assisted with the fascial closure, and performed the skin closure and dressing application.      12/12/2022 12:00 PM Chano Aguilera D.O.

## (undated) DEVICE — GLOVE BIOGEL PI ORTHO SZ 8 PF LF (40PR/BX)

## (undated) DEVICE — GOWN WARMING STANDARD FLEX - (30/CA)

## (undated) DEVICE — DRESSING TRANSPARENT FILM TEGADERM 2.375 X 2.75"  (100EA/BX)"

## (undated) DEVICE — SLEEVE VASO CALF MED - (10PR/CA)

## (undated) DEVICE — CHLORAPREP 26 ML APPLICATOR - ORANGE TINT(25/CA)

## (undated) DEVICE — TOWEL STOP TIMEOUT SAFETY FLAG (40EA/CA)

## (undated) DEVICE — STERI STRIP COMPOUND BENZOIN - TINCTURE 0.6ML WITH APPLICATOR (40EA/BX)

## (undated) DEVICE — SET EXTENSION WITH 2 PORTS (48EA/CA) ***PART #2C8610 IS A SUBSTITUTE*****

## (undated) DEVICE — CLIP MED LG INTNL HRZN TI ESCP - (20/BX)

## (undated) DEVICE — CLOSURE SKIN STRIP 1/2 X 4 IN - (STERI STRIP) (50/BX 4BX/CA)

## (undated) DEVICE — ELECTRODE DUAL RETURN W/ CORD - (50/PK)

## (undated) DEVICE — LACTATED RINGERS INJ 1000 ML - (14EA/CA 60CA/PF)

## (undated) DEVICE — SPONGE GAUZESTER. 2X2 4-PL - (2/PK 50PK/BX 30BX/CS)

## (undated) DEVICE — SODIUM CHL IRRIGATION 0.9% 1000ML (12EA/CA)

## (undated) DEVICE — TROCAR LAPSCP 100MM 12MM NTHRD - (6/BX)

## (undated) DEVICE — TROCAR Z THREAD11MM OPTICAL - NON BLADED(6/BX)

## (undated) DEVICE — CANISTER SUCTION 3000ML MECHANICAL FILTER AUTO SHUTOFF MEDI-VAC NONSTERILE LF DISP  (40EA/CA)

## (undated) DEVICE — SUCTION INSTRUMENT YANKAUER BULBOUS TIP W/O VENT (50EA/CA)

## (undated) DEVICE — SUTURE 0 VICRYL PLUS UR-6 - 27 INCH (36/BX)

## (undated) DEVICE — TROCAR 5X100 NON BLADED Z-TH - READ KII (6/BX)

## (undated) DEVICE — SET SUCTION/IRRIGATION WITH DISPOSABLE TIP (6/CA )PART #0250-070-520 IS A SUB

## (undated) DEVICE — DRAPESURG STERI-DRAPE LONG - (10/BX 4BX/CA)

## (undated) DEVICE — TUBING CLEARLINK DUO-VENT - C-FLO (48EA/CA)

## (undated) DEVICE — COVER LIGHT HANDLE ALC PLUS DISP (18EA/BX)

## (undated) DEVICE — GLOVE BIOGEL SZ 8.5 SURGICAL PF LTX - (50PR/BX 4BX/CA)

## (undated) DEVICE — CANNULA W/SEAL 5X100 Z-THRE - ADED KII (12/BX)

## (undated) DEVICE — PACK LAP CHOLE OR - (2EA/CA)

## (undated) DEVICE — SET TUBING PNEUMOCLEAR HIGH FLOW SMOKE EVACUATION (10EA/BX)

## (undated) DEVICE — SUTURE 4-0 MONOCRYL PLUS PS-2 - 27 INCH (36/BX)

## (undated) DEVICE — SENSOR OXIMETER ADULT SPO2 RD SET (20EA/BX)

## (undated) DEVICE — GLOVE BIOGEL SZ 8 SURGICAL PF LTX - (50PR/BX 4BX/CA)

## (undated) DEVICE — BAG RETRIEVAL 10ML (10EA/BX)

## (undated) DEVICE — SUTURE GENERAL

## (undated) DEVICE — SET LEADWIRE 5 LEAD BEDSIDE DISPOSABLE ECG (1SET OF 5/EA)